# Patient Record
Sex: MALE | Race: WHITE | NOT HISPANIC OR LATINO | URBAN - METROPOLITAN AREA
[De-identification: names, ages, dates, MRNs, and addresses within clinical notes are randomized per-mention and may not be internally consistent; named-entity substitution may affect disease eponyms.]

---

## 2019-04-05 ENCOUNTER — OFFICE (OUTPATIENT)
Dept: URBAN - METROPOLITAN AREA CLINIC 101 | Facility: CLINIC | Age: 79
End: 2019-04-05
Payer: COMMERCIAL

## 2019-04-05 VITALS
HEART RATE: 67 BPM | HEIGHT: 73 IN | SYSTOLIC BLOOD PRESSURE: 152 MMHG | TEMPERATURE: 97.7 F | DIASTOLIC BLOOD PRESSURE: 80 MMHG | WEIGHT: 202 LBS

## 2019-04-05 DIAGNOSIS — R19.5 OTHER FECAL ABNORMALITIES: ICD-10-CM

## 2019-04-05 PROCEDURE — 99203 OFFICE O/P NEW LOW 30 MIN: CPT

## 2019-04-05 NOTE — SERVICEHPINOTES
ANETA MURPHY   is a   78   year old male who is being seen in consultation at the request of   BECKI THOMPSON   for OV prior to colonoscopy due to positive cologuard. His last colonoscopy was approx. 30 years ago but denies any h/o polyps. Denies family hx of colon cancer or polyps. States his BMs changed approx. 1 year ago which he contributes to diabetes have been more regular recently. Denies diarrhea, constipation, abdominal pain, rectal bleeding, dark stools, weight loss, fever, or chills. Denies any cardiac or pulmonary issues.

## 2019-05-10 PROBLEM — R19.5 STOOL DNA-BASED COLORECTAL CANCER SCREENING POSITIVE: Status: ACTIVE | Noted: 2019-05-10

## 2019-05-16 ENCOUNTER — ON CAMPUS - OUTPATIENT (OUTPATIENT)
Dept: URBAN - METROPOLITAN AREA HOSPITAL 35 | Facility: HOSPITAL | Age: 79
End: 2019-05-16
Payer: COMMERCIAL

## 2019-05-16 DIAGNOSIS — D12.5 BENIGN NEOPLASM OF SIGMOID COLON: ICD-10-CM

## 2019-05-16 DIAGNOSIS — D12.7 BENIGN NEOPLASM OF RECTOSIGMOID JUNCTION: ICD-10-CM

## 2019-05-16 DIAGNOSIS — K63.5 POLYP OF COLON: ICD-10-CM

## 2019-05-16 DIAGNOSIS — R19.5 OTHER FECAL ABNORMALITIES: ICD-10-CM

## 2019-05-16 PROCEDURE — 45380 COLONOSCOPY AND BIOPSY: CPT | Mod: 59

## 2019-05-16 PROCEDURE — 45385 COLONOSCOPY W/LESION REMOVAL: CPT

## 2020-01-10 ENCOUNTER — OFFICE (OUTPATIENT)
Dept: URBAN - METROPOLITAN AREA CLINIC 101 | Facility: CLINIC | Age: 80
End: 2020-01-10
Payer: COMMERCIAL

## 2020-01-10 VITALS
WEIGHT: 191 LBS | HEART RATE: 73 BPM | HEIGHT: 73 IN | TEMPERATURE: 98.1 F | SYSTOLIC BLOOD PRESSURE: 130 MMHG | DIASTOLIC BLOOD PRESSURE: 70 MMHG

## 2020-01-10 DIAGNOSIS — R19.7 DIARRHEA, UNSPECIFIED: ICD-10-CM

## 2020-01-10 DIAGNOSIS — R10.13 EPIGASTRIC PAIN: ICD-10-CM

## 2020-01-10 PROCEDURE — 99214 OFFICE O/P EST MOD 30 MIN: CPT

## 2020-01-10 NOTE — SERVICEHPINOTES
ANETA MURPHY   is a   79   male who complains of stomach issues. Indu reports issues for past 6 months. He was recently diagnosed with diabetes 4 months ago and started on a new medication (metformin?) and started on 2 pills daily. Started having more issues with diarrhea/loose stool that tends to alternate with constipation. At times can go 3 days without a BM and then have cramping, loose stool following. Also reports a change in his urine color from yellow to orange. He has an appt with urologist later this month. BSS type 3, 7. He wonders if he needs an EGG. He had a colonoscopy 5/16/19 with benign tubular adenomas, otherwise unremarkable. His weight has remained stable as he usually fluctuates b/t 180 and 190 and has for his whole life. Gained some weight last year which he has since lost. Denies n/v or dysphagia. Occasional "twinges" of pain in upper abdomen but does not seem to be related to eating.

## 2021-01-20 ENCOUNTER — OFFICE (OUTPATIENT)
Dept: URBAN - METROPOLITAN AREA CLINIC 102 | Facility: CLINIC | Age: 81
End: 2021-01-20
Payer: COMMERCIAL

## 2021-01-20 VITALS
WEIGHT: 195 LBS | TEMPERATURE: 97.5 F | DIASTOLIC BLOOD PRESSURE: 82 MMHG | HEIGHT: 73 IN | HEART RATE: 72 BPM | SYSTOLIC BLOOD PRESSURE: 140 MMHG

## 2021-01-20 DIAGNOSIS — R19.7 DIARRHEA, UNSPECIFIED: ICD-10-CM

## 2021-01-20 PROCEDURE — 99214 OFFICE O/P EST MOD 30 MIN: CPT

## 2021-01-20 NOTE — SERVICEHPINOTES
ANETA MURPHY   is a   80  male who presents for GI issues. He states approx. 9 months ago started having issues with loose stool. At first, stools seemed to be more solid but seemed "oily". Now, having mostly BSS type 6-7 stools. BMs a couple times per day. Mostly postprandial although does not seem to matter what he eats. No nocturnal BMs. He does get a lot of abd pain/cramping prior to BMs, completely relived with BMs. Gas pain and increased borborgymi as well. He had a colonoscopy 5/2019 with benign adenomatous polyps, o/w unremarkable. Denies weight loss, rectal bleeding. He wonders if he could have EPI. He is taking metformin, has been taking 2 pills qAM and 1 pill qhs--started this medication more recently (maybe prior to sx onset although not sure). No recent antibiotics. No other new medications. He has not tried probiotics.

## 2021-01-28 LAB

## 2022-07-26 ENCOUNTER — OFFICE (OUTPATIENT)
Dept: URBAN - METROPOLITAN AREA CLINIC 34 | Facility: CLINIC | Age: 82
End: 2022-07-26
Payer: COMMERCIAL

## 2022-07-26 VITALS
WEIGHT: 180 LBS | SYSTOLIC BLOOD PRESSURE: 147 MMHG | TEMPERATURE: 97.7 F | HEART RATE: 70 BPM | HEIGHT: 73 IN | DIASTOLIC BLOOD PRESSURE: 92 MMHG

## 2022-07-26 DIAGNOSIS — R63.4 ABNORMAL WEIGHT LOSS: ICD-10-CM

## 2022-07-26 DIAGNOSIS — R19.8 OTHER SPECIFIED SYMPTOMS AND SIGNS INVOLVING THE DIGESTIVE S: ICD-10-CM

## 2022-07-26 DIAGNOSIS — R63.0 ANOREXIA: ICD-10-CM

## 2022-07-26 DIAGNOSIS — R19.7 DIARRHEA, UNSPECIFIED: ICD-10-CM

## 2022-07-26 PROCEDURE — 99214 OFFICE O/P EST MOD 30 MIN: CPT

## 2022-07-26 NOTE — SERVICEHPINOTES
ANETA MURPHY   is a   82   male who presents for stomach issues. Reports recently abdomen/digestion has been different.  Having inc borborgymi which is different. Constipation alternating w/ diarrhea. ~2 days with constipation without any BMs. Then will take a small tsp of miralax which works great. The next day will have a BM w/ straining/hard stool followed by looser stool/"blow out" then feels emptied out. No abdominal pain or cramping. No nausea but doesn't have an appetite, no interest in food. Denies early satiety. He has lost weight over the years--15 lbs lighter than he was at last OV 1 year ago. No rectal bleeding. No dysphagia or odynophagia.  Normal LFTs in May.
brHe had a colonoscopy 5/2019 with benign adenomatous polyps, o/w unremarkable. 
br 1/2021 neg GI profile, normal fecal elastase.br   UGI in 2020 was normal.

## 2022-08-18 LAB
CREATININE: 1.11 MG/DL (ref 0.76–1.27)
CREATININE: EGFR: 66 ML/MIN/1.73 (ref 59–?)

## 2024-02-01 ENCOUNTER — OFFICE (OUTPATIENT)
Dept: URBAN - METROPOLITAN AREA CLINIC 102 | Facility: CLINIC | Age: 84
End: 2024-02-01
Payer: COMMERCIAL

## 2024-02-01 VITALS
WEIGHT: 185 LBS | TEMPERATURE: 98.1 F | HEIGHT: 73 IN | SYSTOLIC BLOOD PRESSURE: 132 MMHG | DIASTOLIC BLOOD PRESSURE: 62 MMHG | HEART RATE: 46 BPM

## 2024-02-01 DIAGNOSIS — K58.9 IRRITABLE BOWEL SYNDROME WITHOUT DIARRHEA: ICD-10-CM

## 2024-02-01 DIAGNOSIS — D49.0 NEOPLASM OF UNSPECIFIED BEHAVIOR OF DIGESTIVE SYSTEM: ICD-10-CM

## 2024-02-01 DIAGNOSIS — R19.7 DIARRHEA, UNSPECIFIED: ICD-10-CM

## 2024-02-01 DIAGNOSIS — Z86.010 PERSONAL HISTORY OF COLONIC POLYPS: ICD-10-CM

## 2024-02-01 PROCEDURE — 99214 OFFICE O/P EST MOD 30 MIN: CPT | Performed by: INTERNAL MEDICINE
